# Patient Record
Sex: MALE | Race: WHITE | Employment: OTHER | URBAN - METROPOLITAN AREA
[De-identification: names, ages, dates, MRNs, and addresses within clinical notes are randomized per-mention and may not be internally consistent; named-entity substitution may affect disease eponyms.]

---

## 2023-05-08 NOTE — H&P
History and Physical Service   [place] [address]    HISTORY AND PHYSICAL EXAMINATION            Date of Evaluation: 5/9/2023  Patient name:  Klaudia Sosa  MRN:   [id number]  YOB: 1945  PCP:    Bart Severin, APRN - CNP    History Obtained From:     Patient, medical records    History of Present Illness: This is Klaudia Sosa a 65 y.o. male who presents today for a COLORECTAL CANCER SCREENING, NOT HIGH RISK by Maryam Olivas MD for Colon cancer screening; H/O adenomatous polyp of colon. Patient follows with Dr Ludwin Deleon Hx of previous colonoscopy with hx adenomatous polyps His last 2018 with approximately 4 prior  He was advised to have his routine colonoscopy  and arrived for his scheduled procedure. He followed the bowel prep until watery  clear No FH colon cancer or polyps but mother had pancreatic cancer. He denies bowel changes, constipation alternating with diarrhea, bloody tarry stools, abdominal pain or unintentional weight loss. Today denies fever, chills, shortness of breath, cough, congestion, wheezing, chest pain, open sores or wounds. +DM POC     Past Medical History:     Past Medical History:   Diagnosis Date    Diabetes mellitus ([facility] [address]     Hyperlipidemia     Hypertension     Thyroid disease         Past Surgical History:     Past Surgical History:   Procedure Laterality Date    COLONOSCOPY      EYE SURGERY Bilateral     cataract    MANDIBLE SURGERY Bilateral     SKIN BIOPSY      THYROIDECTOMY      TONSILLECTOMY          Medications Prior to Admission:     Prior to Admission medications    Medication Sig Start Date End Date Taking? Authorizing Provider   cyanocobalamin 1000 MCG tablet Take 2 tablets by mouth daily 2/8/23  Yes Historical Provider, MD   levothyroxine (SYNTHROID) 137 MCG tablet Take 1 tablet by mouth daily 10/4/22  Yes Historical Provider, MD   sildenafil (VIAGRA) 50 MG tablet 0 tablets (0 mg total).  1/11/14  Yes Historical Provider, MD   allopurinol

## 2023-05-09 ENCOUNTER — ANESTHESIA (OUTPATIENT)
Dept: OPERATING ROOM | Age: 78
End: 2023-05-09
Payer: COMMERCIAL

## 2023-05-09 ENCOUNTER — HOSPITAL ENCOUNTER (OUTPATIENT)
Age: 78
Setting detail: OUTPATIENT SURGERY
Discharge: HOME | End: 2023-05-09
Attending: COLON & RECTAL SURGERY | Admitting: COLON & RECTAL SURGERY
Payer: COMMERCIAL

## 2023-05-09 ENCOUNTER — ANESTHESIA EVENT (OUTPATIENT)
Dept: OPERATING ROOM | Age: 78
End: 2023-05-09
Payer: COMMERCIAL

## 2023-05-09 VITALS
SYSTOLIC BLOOD PRESSURE: 166 MMHG | DIASTOLIC BLOOD PRESSURE: 85 MMHG | BODY MASS INDEX: 25.58 KG/M2 | HEIGHT: 74 IN | OXYGEN SATURATION: 99 % | TEMPERATURE: 98.1 F | RESPIRATION RATE: 14 BRPM | WEIGHT: 199.3 LBS | HEART RATE: 77 BPM

## 2023-05-09 DIAGNOSIS — Z86.010 H/O ADENOMATOUS POLYP OF COLON: ICD-10-CM

## 2023-05-09 DIAGNOSIS — Z12.11 COLON CANCER SCREENING: ICD-10-CM

## 2023-05-09 LAB — GLUCOSE BLD-MCNC: 233 MG/DL (ref 75–110)

## 2023-05-09 PROCEDURE — 3609010400 HC COLONOSCOPY POLYPECTOMY HOT BIOPSY: Performed by: COLON & RECTAL SURGERY

## 2023-05-09 PROCEDURE — C1889 IMPLANT/INSERT DEVICE, NOC: HCPCS | Performed by: COLON & RECTAL SURGERY

## 2023-05-09 PROCEDURE — 88305 TISSUE EXAM BY PATHOLOGIST: CPT

## 2023-05-09 PROCEDURE — 3700000000 HC ANESTHESIA ATTENDED CARE: Performed by: COLON & RECTAL SURGERY

## 2023-05-09 PROCEDURE — 7100000010 HC PHASE II RECOVERY - FIRST 15 MIN: Performed by: COLON & RECTAL SURGERY

## 2023-05-09 PROCEDURE — 82947 ASSAY GLUCOSE BLOOD QUANT: CPT

## 2023-05-09 PROCEDURE — 7100000011 HC PHASE II RECOVERY - ADDTL 15 MIN: Performed by: COLON & RECTAL SURGERY

## 2023-05-09 PROCEDURE — 2580000003 HC RX 258: Performed by: ANESTHESIOLOGY

## 2023-05-09 PROCEDURE — 3700000001 HC ADD 15 MINUTES (ANESTHESIA): Performed by: COLON & RECTAL SURGERY

## 2023-05-09 PROCEDURE — 2709999900 HC NON-CHARGEABLE SUPPLY: Performed by: COLON & RECTAL SURGERY

## 2023-05-09 PROCEDURE — 6360000002 HC RX W HCPCS: Performed by: NURSE ANESTHETIST, CERTIFIED REGISTERED

## 2023-05-09 PROCEDURE — 2500000003 HC RX 250 WO HCPCS: Performed by: NURSE ANESTHETIST, CERTIFIED REGISTERED

## 2023-05-09 RX ORDER — SODIUM CHLORIDE, SODIUM LACTATE, POTASSIUM CHLORIDE, CALCIUM CHLORIDE 600; 310; 30; 20 MG/100ML; MG/100ML; MG/100ML; MG/100ML
INJECTION, SOLUTION INTRAVENOUS CONTINUOUS
Status: DISCONTINUED | OUTPATIENT
Start: 2023-05-09 | End: 2023-05-09 | Stop reason: HOSPADM

## 2023-05-09 RX ORDER — SILDENAFIL 50 MG/1
TABLET, FILM COATED ORAL
COMMUNITY
Start: 2014-01-11

## 2023-05-09 RX ORDER — SODIUM CHLORIDE 9 MG/ML
INJECTION, SOLUTION INTRAVENOUS CONTINUOUS
Status: DISCONTINUED | OUTPATIENT
Start: 2023-05-09 | End: 2023-05-09

## 2023-05-09 RX ORDER — ALLOPURINOL 100 MG/1
TABLET ORAL
COMMUNITY
Start: 2023-05-06

## 2023-05-09 RX ORDER — LIDOCAINE HYDROCHLORIDE 10 MG/ML
1 INJECTION, SOLUTION EPIDURAL; INFILTRATION; INTRACAUDAL; PERINEURAL
Status: DISCONTINUED | OUTPATIENT
Start: 2023-05-09 | End: 2023-05-09 | Stop reason: HOSPADM

## 2023-05-09 RX ORDER — SODIUM CHLORIDE 0.9 % (FLUSH) 0.9 %
5-40 SYRINGE (ML) INJECTION PRN
Status: DISCONTINUED | OUTPATIENT
Start: 2023-05-09 | End: 2023-05-09 | Stop reason: HOSPADM

## 2023-05-09 RX ORDER — SIMVASTATIN 20 MG
TABLET ORAL
COMMUNITY
Start: 2023-03-07

## 2023-05-09 RX ORDER — HYDROCHLOROTHIAZIDE 25 MG/1
TABLET ORAL
COMMUNITY
Start: 2023-05-03

## 2023-05-09 RX ORDER — PROPOFOL 10 MG/ML
INJECTION, EMULSION INTRAVENOUS PRN
Status: DISCONTINUED | OUTPATIENT
Start: 2023-05-09 | End: 2023-05-09 | Stop reason: SDUPTHER

## 2023-05-09 RX ORDER — SODIUM CHLORIDE 9 MG/ML
INJECTION, SOLUTION INTRAVENOUS PRN
Status: DISCONTINUED | OUTPATIENT
Start: 2023-05-09 | End: 2023-05-09 | Stop reason: HOSPADM

## 2023-05-09 RX ORDER — LIDOCAINE HYDROCHLORIDE 20 MG/ML
INJECTION, SOLUTION EPIDURAL; INFILTRATION; INTRACAUDAL; PERINEURAL PRN
Status: DISCONTINUED | OUTPATIENT
Start: 2023-05-09 | End: 2023-05-09 | Stop reason: SDUPTHER

## 2023-05-09 RX ORDER — SODIUM CHLORIDE 0.9 % (FLUSH) 0.9 %
5-40 SYRINGE (ML) INJECTION EVERY 12 HOURS SCHEDULED
Status: DISCONTINUED | OUTPATIENT
Start: 2023-05-09 | End: 2023-05-09 | Stop reason: HOSPADM

## 2023-05-09 RX ORDER — METFORMIN HYDROCHLORIDE 500 MG/1
TABLET, EXTENDED RELEASE ORAL
COMMUNITY
Start: 2023-04-17

## 2023-05-09 RX ORDER — LEVOTHYROXINE SODIUM 137 UG/1
137 TABLET ORAL DAILY
COMMUNITY
Start: 2022-10-04

## 2023-05-09 RX ADMIN — PROPOFOL 20 MG: 10 INJECTION, EMULSION INTRAVENOUS at 07:59

## 2023-05-09 RX ADMIN — PROPOFOL 40 MG: 10 INJECTION, EMULSION INTRAVENOUS at 08:07

## 2023-05-09 RX ADMIN — PROPOFOL 20 MG: 10 INJECTION, EMULSION INTRAVENOUS at 08:14

## 2023-05-09 RX ADMIN — PROPOFOL 30 MG: 10 INJECTION, EMULSION INTRAVENOUS at 07:32

## 2023-05-09 RX ADMIN — SODIUM CHLORIDE, POTASSIUM CHLORIDE, SODIUM LACTATE AND CALCIUM CHLORIDE: 600; 310; 30; 20 INJECTION, SOLUTION INTRAVENOUS at 08:24

## 2023-05-09 RX ADMIN — PROPOFOL 20 MG: 10 INJECTION, EMULSION INTRAVENOUS at 08:24

## 2023-05-09 RX ADMIN — SODIUM CHLORIDE, POTASSIUM CHLORIDE, SODIUM LACTATE AND CALCIUM CHLORIDE: 600; 310; 30; 20 INJECTION, SOLUTION INTRAVENOUS at 06:37

## 2023-05-09 RX ADMIN — PROPOFOL 20 MG: 10 INJECTION, EMULSION INTRAVENOUS at 07:34

## 2023-05-09 RX ADMIN — PROPOFOL 20 MG: 10 INJECTION, EMULSION INTRAVENOUS at 08:11

## 2023-05-09 RX ADMIN — PROPOFOL 20 MG: 10 INJECTION, EMULSION INTRAVENOUS at 07:53

## 2023-05-09 RX ADMIN — PROPOFOL 20 MG: 10 INJECTION, EMULSION INTRAVENOUS at 08:18

## 2023-05-09 RX ADMIN — LIDOCAINE HYDROCHLORIDE 80 MG: 20 INJECTION, SOLUTION EPIDURAL; INFILTRATION; INTRACAUDAL; PERINEURAL at 07:31

## 2023-05-09 RX ADMIN — PROPOFOL 20 MG: 10 INJECTION, EMULSION INTRAVENOUS at 07:38

## 2023-05-09 RX ADMIN — PROPOFOL 20 MG: 10 INJECTION, EMULSION INTRAVENOUS at 07:55

## 2023-05-09 RX ADMIN — PROPOFOL 20 MG: 10 INJECTION, EMULSION INTRAVENOUS at 07:45

## 2023-05-09 RX ADMIN — PROPOFOL 20 MG: 10 INJECTION, EMULSION INTRAVENOUS at 08:05

## 2023-05-09 RX ADMIN — PROPOFOL 20 MG: 10 INJECTION, EMULSION INTRAVENOUS at 08:01

## 2023-05-09 RX ADMIN — PROPOFOL 20 MG: 10 INJECTION, EMULSION INTRAVENOUS at 07:36

## 2023-05-09 RX ADMIN — PROPOFOL 20 MG: 10 INJECTION, EMULSION INTRAVENOUS at 07:47

## 2023-05-09 RX ADMIN — PROPOFOL 50 MG: 10 INJECTION, EMULSION INTRAVENOUS at 07:31

## 2023-05-09 RX ADMIN — PROPOFOL 20 MG: 10 INJECTION, EMULSION INTRAVENOUS at 08:09

## 2023-05-09 RX ADMIN — PROPOFOL 20 MG: 10 INJECTION, EMULSION INTRAVENOUS at 07:40

## 2023-05-09 RX ADMIN — PROPOFOL 20 MG: 10 INJECTION, EMULSION INTRAVENOUS at 07:50

## 2023-05-09 RX ADMIN — PROPOFOL 20 MG: 10 INJECTION, EMULSION INTRAVENOUS at 08:03

## 2023-05-09 RX ADMIN — PROPOFOL 20 MG: 10 INJECTION, EMULSION INTRAVENOUS at 07:43

## 2023-05-09 RX ADMIN — PROPOFOL 20 MG: 10 INJECTION, EMULSION INTRAVENOUS at 07:57

## 2023-05-09 ASSESSMENT — PAIN - FUNCTIONAL ASSESSMENT: PAIN_FUNCTIONAL_ASSESSMENT: 0-10

## 2023-05-09 NOTE — ANESTHESIA POSTPROCEDURE EVALUATION
Department of Anesthesiology  Postprocedure Note    Patient: Zari Salazar  MRN: [id number]  YOB: 1945  Date of evaluation: 5/9/2023      Procedure Summary     Date: 05/09/23 Room / Location: [place] [address] / [facility] - INPATIENT    Anesthesia Start: 9851 Anesthesia Stop: 1772    Procedure: COLONOSCOPY POLYPECTOMY HOT BIOPSY TATTOO TRANSVERSE COLON, RESOLUTION CLIPS Diagnosis:       Colon cancer screening      H/O adenomatous polyp of colon      (Colon cancer screening [Z12.11])      (H/O adenomatous polyp of colon [Z86.010])    Surgeons: Xochitl Stock MD Responsible Provider: Margareth Romero MD    Anesthesia Type: MAC ASA Status: 2          Anesthesia Type: No value filed.     Rudolph Phase I:      Rudolph Phase II: Rudolph Score: 10      Anesthesia Post Evaluation    Complications: no

## 2023-05-09 NOTE — DISCHARGE INSTRUCTIONS
Patient Discharge Instructions  Discharge Date:  5/9/2023    HYGEINE: No restriction    DRIVING: No driving while taking narcotic medications or while in pain    ACTIVITY: No restriction    DIET: Resume your normal diet as advised by your PCP. SPECIAL INSTRUCTIONS:     Follow up with Dr. Adolph Amaya in 1 week to discuss pathology report of polyps, call the clinic for appointment. Call sooner if fever above 101.4 degrees Celsius, increase in swelling or redness, thick purulent discharge or pain not controlled with medications.

## 2023-05-09 NOTE — OP NOTE
Operative Note      Patient: Resa Favre  YOB: 1945  MRN: [id number]    Date of Procedure: 5/9/2023    Pre-Op Diagnosis Codes:     * Colon cancer screening [Z12.11]     * H/O adenomatous polyp of colon [Z86.010]    Post-Op Diagnosis: Same, and pandiverticulosis and grade 2 internal hemorrhoids, 3X transverse colon polyps        Procedure(s):  COLONOSCOPY POLYPECTOMY HOT BIOPSY TATTOO TRANSVERSE COLON, RESOLUTION CLIPS and Hot Snares for polyp removal     Surgeon(s):  Braeden Kingston MD    Assistant:   Resident: Antonio Leos DO; Hayden Meyer DO    Anesthesia: Monitor Anesthesia Care    Estimated Blood Loss (mL): Minimal    Complications: None    Specimens:   ID Type Source Tests Collected by Time Destination   A : transverse colon poly Tissue Colon-Transverse SURGICAL PATHOLOGY Braeden Kingston MD 5/9/2023 0755        Implants:  * No implants in log *      Drains: * No LDAs found *    Findings: 3 x transverse colon polyps were removed with hot snares, tattoo of largest lesion 2 x resolution clips,grade 2 internal hemorrhoids and pandiverticulosis     Indication: Screening colonoscopy     Detailed Description of Procedure:   HISTORY: The patient is a 65y.o. year old male with history of above preop diagnosis. Colonoscopy with possible biopsy or polypectomy has been recommended and I explained the risk, benefits, expected outcome, and alternatives to the procedure. Risks included but are not limited to bleeding, infection, respiratory distress, hypotension, and perforation of the colon. The patient understands and is in agreement. PROCEDURE: The patient was given monitored anesthesia care. The patient was given oxygen by nasal cannula. The colonoscope was inserted per rectum and advanced under direct vision to the cecum without difficulty.      Findings:  Cecum/Ascending colon: Mild diverticulosis     Transverse colon: 3 x transverse colon polyps were removed with hot snares, tattoo of largest lesion 2

## 2023-05-09 NOTE — ANESTHESIA PRE PROCEDURE
Department of Anesthesiology  Preprocedure Note       Name:  Dionicio Gilmore   Age:  65 y.o.  :  1945                                          MRN:  [id number]         Date:  2023      Surgeon: Cayden Castellanos):  Erin Robles MD    Procedure: Procedure(s):  COLORECTAL CANCER SCREENING, NOT HIGH RISK    Medications prior to admission:   Prior to Admission medications    Medication Sig Start Date End Date Taking? Authorizing Provider   cyanocobalamin 1000 MCG tablet Take 2 tablets by mouth daily 23  Yes Historical Provider, MD   levothyroxine (SYNTHROID) 137 MCG tablet Take 1 tablet by mouth daily 10/4/22  Yes Historical Provider, MD   sildenafil (VIAGRA) 50 MG tablet 0 tablets (0 mg total). 14  Yes Historical Provider, MD   allopurinol (ZYLOPRIM) 100 MG tablet  23   Historical Provider, MD   hydroCHLOROthiazide (HYDRODIURIL) 25 MG tablet  5/3/23   Historical Provider, MD   metFORMIN (GLUCOPHAGE-XR) 500 MG extended release tablet take 2 tablets by mouth every morning and 2 tablets every evening 23   Historical Provider, MD   simvastatin (ZOCOR) 20 MG tablet take 1 tablet by mouth three times a week ON MONDAY, WEDNESDAY, AND FRIDAY 3/7/23   Historical Provider, MD       Current medications:    Current Facility-Administered Medications   Medication Dose Route Frequency Provider Last Rate Last Admin    lidocaine PF 1 % injection 1 mL  1 mL IntraDERmal Once PRN Naeem Black MD        lactated ringers IV soln infusion   IntraVENous Continuous Naeem Black  mL/hr at 23 0637 New Bag at 23 5213    sodium chloride flush 0.9 % injection 5-40 mL  5-40 mL IntraVENous 2 times per day Naeem Black MD        sodium chloride flush 0.9 % injection 5-40 mL  5-40 mL IntraVENous PRN Naeem Black MD        0.9 % sodium chloride infusion   IntraVENous PRN Naeem Black MD           Allergies:     Allergies   Allergen Reactions    Lisinopril Anaphylaxis and Hives     Tongue and lips swelling

## 2023-05-10 LAB — SURGICAL PATHOLOGY REPORT: NORMAL

## (undated) DEVICE — TRAP SURG QUAD PARABOLA SLOT DSGN SFTY SCRN TRAPEASE

## (undated) DEVICE — CO2 CANNULA,SUPERSOFT, ADLT,7'O2,7'CO2: Brand: MEDLINE

## (undated) DEVICE — TUBING, SUCTION, 1/4" X 12', STRAIGHT: Brand: MEDLINE

## (undated) DEVICE — ADAPTER TBNG LUER STUB 15 GA INTMED

## (undated) DEVICE — Device: Brand: SPOT EX ENDOSCOPIC TATTOO

## (undated) DEVICE — ELECTRODE PT RET AD L9FT HI MOIST COND ADH HYDRGEL CORDED

## (undated) DEVICE — SNARE ENDOSCP AD L240CM LOOP W10MM SHTH DIA2.4MM RND INSUL

## (undated) DEVICE — CLIP LIG L235CM RESOL 360 BX/20

## (undated) DEVICE — SYRINGE MED 50ML LUERLOCK TIP

## (undated) DEVICE — THE CARR-LOCKE INJECTION NEEDLE IS A SINGLE USE, DISPOSABLE, FLEXIBLE SHEATH INJECTION NEEDLE USED FOR THE INJECTION OF VARIOUS TYPES OF MEDIA THROUGH FLEXIBLE ENDOSCOPES.

## (undated) DEVICE — SINGLE-USE POLYPECTOMY SNARE: Brand: CAPTIFLEX

## (undated) DEVICE — BASIN EMSIS 700ML GRAPHITE PLAS KID SHP GRAD

## (undated) DEVICE — MEDICINE CUP, GRADUATED, STER: Brand: MEDLINE